# Patient Record
Sex: FEMALE | Race: WHITE | NOT HISPANIC OR LATINO | Employment: FULL TIME | URBAN - METROPOLITAN AREA
[De-identification: names, ages, dates, MRNs, and addresses within clinical notes are randomized per-mention and may not be internally consistent; named-entity substitution may affect disease eponyms.]

---

## 2021-05-07 ENCOUNTER — ANNUAL EXAM (OUTPATIENT)
Dept: OBGYN CLINIC | Facility: CLINIC | Age: 41
End: 2021-05-07
Payer: COMMERCIAL

## 2021-05-07 VITALS
DIASTOLIC BLOOD PRESSURE: 88 MMHG | SYSTOLIC BLOOD PRESSURE: 130 MMHG | BODY MASS INDEX: 26.81 KG/M2 | WEIGHT: 166.8 LBS | HEIGHT: 66 IN

## 2021-05-07 DIAGNOSIS — N93.0 PCB (POST COITAL BLEEDING): ICD-10-CM

## 2021-05-07 DIAGNOSIS — Z12.31 ENCOUNTER FOR SCREENING MAMMOGRAM FOR MALIGNANT NEOPLASM OF BREAST: ICD-10-CM

## 2021-05-07 DIAGNOSIS — N92.3 INTERMENSTRUAL BLEEDING: ICD-10-CM

## 2021-05-07 DIAGNOSIS — Z01.419 ENCOUNTER FOR ANNUAL ROUTINE GYNECOLOGICAL EXAMINATION: Primary | ICD-10-CM

## 2021-05-07 PROCEDURE — 99395 PREV VISIT EST AGE 18-39: CPT | Performed by: OBSTETRICS & GYNECOLOGY

## 2021-05-07 NOTE — PROGRESS NOTES
Annual Wellness Visit  [address]   [address] [facility], [place], [address] [address]    ASSESSMENT/PLAN: Mata Galarza is a 45 y o   who presents for annual gynecologic exam     Encounter for routine gynecologic examination  - Routine well woman exam completed today  - Cervical Cancer Screening: Current ASCCP Guidelines reviewed  Last Pap: 2020 neg w/ neg HPV  Next Pap Due: 2-3 years  - HPV Vaccination status: Not immunized  - Contraceptive counseling discussed  Current contraception: vasectomy,   - The following were reviewed in today's visit: mammography screening ordered, exercise and healthy diet    Additional problems addressed during this visit:  1  Encounter for annual routine gynecological examination    2  Encounter for screening mammogram for malignant neoplasm of breast  Comments:  Recommend get done after 42yo  Orders:  -     Mammo screening bilateral w 3d & cad; Future; Expected date: 2022    3  Intermenstrual bleeding  Assessment & Plan:  Discussed w/ pt differential dx includes cervical polyp, friable ectropion, endometrial polyp, endometrial hyperplasia or malignancy  Pap last year neg w/ neg HPV  Due to pt's age endometrial hyperplasia of malignancy unlikely  On exam some friable area of ectropion - cauterized w/ silver nitrate in office  Will get pelvic u/s after next period, order given  If normal u/s and spotting continues, will recom ECC, EMB in office, GC/CT, trich to fully evaluate  Will call pt w/ u/s results to f/u    Orders:  -     US pelvis complete w transvaginal; Future; Expected date: 2021    4  PCB (post coital bleeding)  -     US pelvis complete w transvaginal; Future; Expected date: 2021      Next visit: 1 year Wellness      CC:  Annual Gynecologic Examination    HPI: Mata Galarza is a 45 y o  Rollo Pilling who presents for annual gynecologic examination  She denies any breast or urinary issues at today's visit    Periods have been mostly regular over last year - varying between 26-30 days  In the last couple months some spotting in between periods, sometimes small clots, not a specific pattern  More than just 1-2days before or after menses  No pain  Over last 6 months or so bleeding after intercourse, again no pain  Per pt 38 yo sister just had hysterectomy for fibroids and told "precancerous"  Gyn History  Patient's last menstrual period was 2021  Menstrual History  Menarche Age: 16 years  Period Pattern: Regular(but with intramenstrual bleeding last couple months)  Last Pap: 2020 was normal    She  reports being sexually active and has had partner(s) who are Male  She reports using the following method of birth control/protection: Male Sterilization  Sexual History  Is Patient in a Monogamous Relationship?: Yes    OB History      Past Medical History:  No date: Depression with anxiety      Comment:  no medication      No past surgical history on file  Family History   Problem Relation Age of Onset    Diabetes Father     Breast cancer Neg Hx     Tracheal cancer Neg Hx     Ovarian cancer Neg Hx     Colon cancer Neg Hx         Social History     Tobacco Use    Smoking status: Never Smoker    Smokeless tobacco: Never Used   Substance Use Topics    Alcohol use: Not Currently     Comment: Special occasions (1 or less/month)     Drug use: Never        No current outpatient medications on file  She has No Known Allergies       ROS negative except as noted in HPI    Objective:  /88   Ht 5' 5 5" (1 664 m)   Wt 75 7 kg (166 lb 12 8 oz)   LMP 2021   Breastfeeding No   BMI 27 33 kg/m²      Physical Exam  Constitutional:       Appearance: Normal appearance  Chest:      Breasts:         Right: No mass or tenderness  Left: No mass or tenderness  Abdominal:      Palpations: Abdomen is soft  Tenderness: There is no abdominal tenderness     Genitourinary:     General: Normal vulva  Vagina: No bleeding or lesions  Cervix: Normal       Uterus: Normal  Not tender  Adnexa:         Right: No mass or tenderness  Left: No mass or tenderness  Rectum: No external hemorrhoid  Comments: Pool of blood in vault - 5-10 cc  After cleared, large ectropian and some bleeding left side - treated w/ silver nitrate  Musculoskeletal: Normal range of motion  Lymphadenopathy:      Upper Body:      Right upper body: No axillary adenopathy  Left upper body: No axillary adenopathy  Neurological:      Mental Status: She is alert and oriented to person, place, and time     Psychiatric:         Mood and Affect: Mood normal          Behavior: Behavior normal

## 2021-05-07 NOTE — ASSESSMENT & PLAN NOTE
Discussed w/ pt differential dx includes cervical polyp, friable ectropion, endometrial polyp, endometrial hyperplasia or malignancy  Pap last year neg w/ neg HPV  Due to pt's age endometrial hyperplasia of malignancy unlikely  On exam some friable area of ectropion - cauterized w/ silver nitrate in office  Will get pelvic u/s after next period, order given  If normal u/s and spotting continues, will recom ECC, EMB in office, GC/CT, trich to fully evaluate     Will call pt w/ u/s results to f/u

## 2021-05-07 NOTE — LETTER
May 7, 2021     Corrinne Shows, DO  [address]    Patient: Lia Tong   YOB: 1980   Date of Visit: 2021       Dear Dr Misha Arroyo:    Thank you for referring Lia Tong to me for evaluation  Below are my notes for this consultation  If you have questions, please do not hesitate to call me  I look forward to following your patient along with you  Sincerely,        Ifeanyi Caraballo MD        CC: No Recipients  Ifeanyi Caraballo MD  2021  2:41 PM  Sign when Signing Visit  Annual Wellness Visit  [address]   [address] [place] [address], [facility]    ASSESSMENT/PLAN: Lia Tong is a 45 y o   who presents for annual gynecologic exam     Encounter for routine gynecologic examination  - Routine well woman exam completed today  - Cervical Cancer Screening: Current ASCCP Guidelines reviewed  Last Pap: 2020 neg w/ neg HPV  Next Pap Due: 2-3 years  - HPV Vaccination status: Not immunized  - Contraceptive counseling discussed  Current contraception: vasectomy,   - The following were reviewed in today's visit: mammography screening ordered, exercise and healthy diet    Additional problems addressed during this visit:  1  Encounter for annual routine gynecological examination    2  Encounter for screening mammogram for malignant neoplasm of breast  Comments:  Recommend get done after 40yo  Orders:  -     Mammo screening bilateral w 3d & cad; Future; Expected date: 2022    3  Intermenstrual bleeding  Assessment & Plan:  Discussed w/ pt differential dx includes cervical polyp, friable ectropion, endometrial polyp, endometrial hyperplasia or malignancy  Pap last year neg w/ neg HPV  Due to pt's age endometrial hyperplasia of malignancy unlikely  On exam some friable area of ectropion - cauterized w/ silver nitrate in office  Will get pelvic u/s after next period, order given    If normal u/s and spotting continues, will recom ECC, EMB in office, GC/CT, trich to fully evaluate  Will call pt w/ u/s results to f/u    Orders:  -     US pelvis complete w transvaginal; Future; Expected date: 2021    4  PCB (post coital bleeding)  -     US pelvis complete w transvaginal; Future; Expected date: 2021      Next visit: 1 year Wellness      CC:  Annual Gynecologic Examination    HPI: Tadeo Holm is a 45 y o  Robin Jazmynette who presents for annual gynecologic examination  She denies any breast or urinary issues at today's visit  Periods have been mostly regular over last year - varying between 26-30 days  In the last couple months some spotting in between periods, sometimes small clots, not a specific pattern  More than just 1-2days before or after menses  No pain  Over last 6 months or so bleeding after intercourse, again no pain  Per pt 40 yo sister just had hysterectomy for fibroids and told "precancerous"  Gyn History  Patient's last menstrual period was 2021  Menstrual History  Menarche Age: 16 years  Period Pattern: Regular(but with intramenstrual bleeding last couple months)  Last Pap: 2020 was normal    She  reports being sexually active and has had partner(s) who are Male  She reports using the following method of birth control/protection: Male Sterilization  Sexual History  Is Patient in a Monogamous Relationship?: Yes    OB History      Past Medical History:  No date: Depression with anxiety      Comment:  no medication      No past surgical history on file       Family History   Problem Relation Age of Onset    Diabetes Father     Breast cancer Neg Hx     Tracheal cancer Neg Hx     Ovarian cancer Neg Hx     Colon cancer Neg Hx         Social History     Tobacco Use    Smoking status: Never Smoker    Smokeless tobacco: Never Used   Substance Use Topics    Alcohol use: Not Currently     Comment: Special occasions (1 or less/month)     Drug use: Never        No current outpatient medications on file  She has No Known Allergies       ROS negative except as noted in HPI    Objective:  /88   Ht 5' 5 5" (1 664 m)   Wt 75 7 kg (166 lb 12 8 oz)   LMP 04/12/2021   Breastfeeding No   BMI 27 33 kg/m²      Physical Exam  Constitutional:       Appearance: Normal appearance  Chest:      Breasts:         Right: No mass or tenderness  Left: No mass or tenderness  Abdominal:      Palpations: Abdomen is soft  Tenderness: There is no abdominal tenderness  Genitourinary:     General: Normal vulva  Vagina: No bleeding or lesions  Cervix: Normal       Uterus: Normal  Not tender  Adnexa:         Right: No mass or tenderness  Left: No mass or tenderness  Rectum: No external hemorrhoid  Comments: Pool of blood in vault - 5-10 cc  After cleared, large ectropian and some bleeding left side - treated w/ silver nitrate  Musculoskeletal: Normal range of motion  Lymphadenopathy:      Upper Body:      Right upper body: No axillary adenopathy  Left upper body: No axillary adenopathy  Neurological:      Mental Status: She is alert and oriented to person, place, and time     Psychiatric:         Mood and Affect: Mood normal          Behavior: Behavior normal

## 2021-08-20 ENCOUNTER — OFFICE VISIT (OUTPATIENT)
Dept: OBGYN CLINIC | Facility: CLINIC | Age: 41
End: 2021-08-20
Payer: COMMERCIAL

## 2021-08-20 VITALS
WEIGHT: 165.8 LBS | SYSTOLIC BLOOD PRESSURE: 150 MMHG | DIASTOLIC BLOOD PRESSURE: 90 MMHG | BODY MASS INDEX: 26.65 KG/M2 | HEIGHT: 66 IN

## 2021-08-20 DIAGNOSIS — N92.3 INTERMENSTRUAL BLEEDING: Primary | ICD-10-CM

## 2021-08-20 DIAGNOSIS — Z01.812 PRE-PROCEDURE LAB EXAM: ICD-10-CM

## 2021-08-20 LAB — SL AMB POCT URINE HCG: NEGATIVE

## 2021-08-20 PROCEDURE — 17250 CHEM CAUT OF GRANLTJ TISSUE: CPT | Performed by: OBSTETRICS & GYNECOLOGY

## 2021-08-20 PROCEDURE — 81025 URINE PREGNANCY TEST: CPT | Performed by: OBSTETRICS & GYNECOLOGY

## 2021-08-20 PROCEDURE — 58100 BIOPSY OF UTERUS LINING: CPT | Performed by: OBSTETRICS & GYNECOLOGY

## 2021-08-20 PROCEDURE — 99213 OFFICE O/P EST LOW 20 MIN: CPT | Performed by: OBSTETRICS & GYNECOLOGY

## 2021-08-20 NOTE — PROGRESS NOTES
[address] [facility] [address], [facility], [facility]    Assessment/Plan:  Jannette Crow is a 45y o  year old  who presents for endometrial biopsy     1  Intermenstrual bleeding  Assessment & Plan:  2021 - pt reports some intramenstrual bleeding last 2 months  Some post coital bleeding last 6 months  Denies pain  On exam friable cervix at left side - treated with silver nitrate  2021 ultrasound normal     Discussed w/ pt differential dx includes cervical polyp, friable ectropion, endometrial polyp, endometrial hyperplasia or malignancy  Pap last year neg w/ neg HPV  Due to pt's age endometrial hyperplasia of malignancy unlikely  On exam some friable area of ectropion - cauterized w/ silver nitrate in office  Pt wishes to get EMB for full evaluation  Reports her sister had a hysterectomy with some "precancerous changes" although she is unclear on details  Offered GC/CT/trich - pt declines STI testing today  Will review results with pt via [facility]  Orders:  -     Tissue Pathology  -     Tissue Pathology  -     Endometrial biopsy    2  Pre-procedure lab exam  -     POCT urine HCG          Subjective:     CC: follow up for irregular bleeding    HPI: Lupillo Longoria is a 45 y o  who presents for endometrial biopsy for irregular bleeding    At 2021 WA - Periods have been mostly regular over last year - varying between 26-30 days  In the last couple months some spotting in between periods, sometimes small clots, not a specific pattern  More than just 1-2days before or after menses  No pain  Over last 6 months or so bleeding after intercourse, again no pain  2021 annual - pt c/o intramenstraul spotting - on exam friable ectropian - silver nitrate applied    2021 uterus 8 7 x 4 0 x 5 0cm, normal uterus, EMS 10mm and non-distorted, ovaries normal     Last couple periods normal, no bleeding in between      Patient's sister had some precancerous changes noted on hysterectomy so she is concerned and wishes to complete evaluation with EMB  The following portions of the patient's history were reviewed and updated as appropriate: allergies, current medications, past family history, past medical history, obstetric history, gynecologic history, past social history, past surgical history and problem list     ROS: Review of Systems   Constitutional: Negative  Gastrointestinal: Negative  Genitourinary: Negative  Psychiatric/Behavioral: Negative  No current outpatient medications on file  Objective:  /90   Ht 5' 5 5" (1 664 m)   Wt 75 2 kg (165 lb 12 8 oz)   LMP 08/04/2021 (Exact Date)   Breastfeeding No   BMI 27 17 kg/m²   BP usually normal - pt nervous about EMB     Physical Exam  Constitutional:       Appearance: Normal appearance  Genitourinary:     General: Normal vulva  Vagina: Normal       Cervix: Normal       Uterus: Normal  Not enlarged and not tender  Adnexa:         Right: No mass or tenderness  Left: No mass or tenderness  Rectum: No external hemorrhoid  Neurological:      Mental Status: She is alert  Psychiatric:         Mood and Affect: Mood normal          Behavior: Behavior normal              Endometrial biopsy    Date/Time: 8/20/2021 11:10 AM  Performed by: Brendan Goncalves MD  Authorized by: Brendan Goncalves MD   Universal Protocol:  Consent: Verbal consent obtained    Risks and benefits: risks, benefits and alternatives were discussed  Consent given by: patient  Patient understanding: patient states understanding of the procedure being performed      Indication:     Indications comment:  Intramenstrual bleeding  Procedure:     Procedure: endometrial biopsy with Pipelle      Procedure comment:  ECC also performed    A bivalve speculum was placed in the vagina: yes      Cervix cleaned and prepped: yes      Uterus sounded: yes      Uterus sound depth (cm):  7    Specimen collected: specimen collected and sent to pathology      Patient tolerated procedure well with no complications: yes

## 2021-08-20 NOTE — ASSESSMENT & PLAN NOTE
5/2021 - pt reports some intramenstrual bleeding last 2 months  Some post coital bleeding last 6 months  Denies pain  On exam friable cervix at left side - treated with silver nitrate  5/2021 ultrasound normal     Discussed w/ pt differential dx includes cervical polyp, friable ectropion, endometrial polyp, endometrial hyperplasia or malignancy  Pap last year neg w/ neg HPV  Due to pt's age endometrial hyperplasia of malignancy unlikely  On exam some friable area of ectropion - cauterized w/ silver nitrate in office  Pt wishes to get EMB for full evaluation  Reports her sister had a hysterectomy with some "precancerous changes" although she is unclear on details  Offered GC/CT/trich - pt declines STI testing today  Will review results with pt via [facility]

## 2021-08-24 LAB
PROCEDURE TYPE: NORMAL
PROCEDURE TYPE: NORMAL
SPECIMEN SOURCE: NORMAL
SPECIMEN SOURCE: NORMAL

## 2025-08-13 ENCOUNTER — TRANSCRIBE ORDERS (OUTPATIENT)
Dept: SLEEP CENTER | Facility: CLINIC | Age: 45
End: 2025-08-13